# Patient Record
Sex: FEMALE | Race: WHITE
[De-identification: names, ages, dates, MRNs, and addresses within clinical notes are randomized per-mention and may not be internally consistent; named-entity substitution may affect disease eponyms.]

---

## 2018-02-14 ENCOUNTER — HOSPITAL ENCOUNTER (OUTPATIENT)
Dept: HOSPITAL 74 - FMAMMOTONE | Age: 43
Discharge: HOME | End: 2018-02-14
Attending: SURGERY
Payer: COMMERCIAL

## 2018-02-14 DIAGNOSIS — D24.2: Primary | ICD-10-CM

## 2018-02-14 DIAGNOSIS — N64.9: ICD-10-CM

## 2018-02-14 DIAGNOSIS — R92.8: ICD-10-CM

## 2018-02-14 PROCEDURE — 0HBU3ZX EXCISION OF LEFT BREAST, PERCUTANEOUS APPROACH, DIAGNOSTIC: ICD-10-PCS | Performed by: SURGERY

## 2018-02-14 PROCEDURE — A4648 IMPLANTABLE TISSUE MARKER: HCPCS

## 2018-02-15 NOTE — PATH
Surgical Pathology Report



Patient Name:  JULIO ZAIDI

Accession #:  

[facility]. Rec. #:  [id number]                                                        

   /Age/Gender:  1975 (Age: 42) / F

Account:  [id number]                                                          

             Location: [facility]

Taken:  2018

Received:  2018

Reported:  2/15/2018

Physicians:  Shannon Carey M.D.

  



Specimen(s) Received

A: LEFT BREAST SPECIMEN WITH CALCIFICATIONS 

B: LEFT BREAST SPECIMEN WITHOUT CALCIFICATIONS 





Clinical History

Nonpalpable lesion

Mammographic findings: Suspicious







Final Diagnosis

A. BREAST, LEFT, WITH CALCIFICATIONS, STEREOTACTIC BIOPSY:

BENIGN BREAST TISSUE SHOWING COARSE CALCIFICATIONS IN ASSOCIATION WITH BENIGN

STROMA.



B. BREAST, LEFT, WITHOUT CALCIFICATIONS, STEREOTACTIC BIOPSY:

BENIGN BREAST TISSUE.





***Electronically Signed***

Concepcion Perez M.D.





Gross Description

A.  Received in formalin, labeled "left breast with calcifications," is a 2.1

cm. in length by 0.2 cm. in diameter tan-yellow, cylindrical portion of

fibroadipose tissue which is submitted in toto in one cassette.

B.  Received in formalin, labeled "left breast without calcification," are 7

tan-yellow, cylindrical portions of fibroadipose tissue ranging from 1.1-2.3 cm.

in length and averaging 0.2 cm. in diameter. The specimen is submitted in toto

in one cassette. 

Time to formalin fixation: 5 minutes

Total formalin fixation time: Approximately 6 hours.

## 2018-02-15 NOTE — OP
DATE OF OPERATION:  02/14/2018

 

PREOPERATIVE DIAGNOSIS:  Abnormal left mammography.

 

POSTOPERATIVE DIAGNOSIS:  Abnormal left mammography.

 

PROCEDURE:  Left stereotactic needle biopsy with clip.  

 

SURGEON:  Dale Mcgowan MD

 

ANESTHESIA:  Local.  

 

COMPLICATIONS:  None.

 

This was a sterile procedure.  

 

INDICATIONS FOR PROCEDURE:  Patient presented for a screening mammography that noted

a cluster of fine white calcifications in the lower inner left breast.  The

recommendation was a needle biopsy.  The procedure was discussed with all the

questions answered.  

 

PROCEDURE IN DETAIL:  Patient was brought to [facility]. Dottie Padron, laid prone on the OR table.  Using the caudal approach, the calcifications in

the inferior inner left breast were identified.  A sterile prep was obtained.  A

target was chosen.  There was a positive stroke margin.  Using Betadine and 1%

lidocaine, a 10-gauge Gesplanos device was used to take several cores from this area. 

Cores showed calcifications within them.  These were handled with the usual

calcification protocol.  A clip was deployed in the area.  Hemostasis was ensured

with direct pressure.  The incision was closed with Steri-Strips.  She tolerated the

procedure well, left the breast imaging center in good condition.  

 

 

DALE MCGOWAN M.D.

 

[id number]

DD: 02/14/2018 12:26

DT: 02/15/2018 07:29

Job #:  [id number]